# Patient Record
Sex: MALE | Race: WHITE | Employment: FULL TIME | URBAN - METROPOLITAN AREA
[De-identification: names, ages, dates, MRNs, and addresses within clinical notes are randomized per-mention and may not be internally consistent; named-entity substitution may affect disease eponyms.]

---

## 2019-01-21 ENCOUNTER — ANESTHESIA EVENT (OUTPATIENT)
Dept: OPERATING ROOM | Age: 29
End: 2019-01-21
Payer: OTHER GOVERNMENT

## 2019-01-22 ENCOUNTER — ANESTHESIA (OUTPATIENT)
Dept: OPERATING ROOM | Age: 29
End: 2019-01-22
Payer: OTHER GOVERNMENT

## 2019-01-22 ENCOUNTER — HOSPITAL ENCOUNTER (OUTPATIENT)
Age: 29
Setting detail: OUTPATIENT SURGERY
Discharge: HOME | End: 2019-01-22
Attending: SPECIALIST | Admitting: SPECIALIST
Payer: OTHER GOVERNMENT

## 2019-01-22 VITALS
DIASTOLIC BLOOD PRESSURE: 84 MMHG | RESPIRATION RATE: 16 BRPM | TEMPERATURE: 97 F | SYSTOLIC BLOOD PRESSURE: 117 MMHG | OXYGEN SATURATION: 96 % | BODY MASS INDEX: 27.47 KG/M2 | HEIGHT: 67 IN | WEIGHT: 175 LBS | HEART RATE: 82 BPM

## 2019-01-22 VITALS
SYSTOLIC BLOOD PRESSURE: 95 MMHG | DIASTOLIC BLOOD PRESSURE: 46 MMHG | OXYGEN SATURATION: 94 % | TEMPERATURE: 98.6 F | RESPIRATION RATE: 12 BRPM

## 2019-01-22 PROCEDURE — 2709999900 HC NON-CHARGEABLE SUPPLY: Performed by: SPECIALIST

## 2019-01-22 PROCEDURE — 6360000002 HC RX W HCPCS: Performed by: NURSE ANESTHETIST, CERTIFIED REGISTERED

## 2019-01-22 PROCEDURE — 3700000001 HC ADD 15 MINUTES (ANESTHESIA): Performed by: SPECIALIST

## 2019-01-22 PROCEDURE — 7100000011 HC PHASE II RECOVERY - ADDTL 15 MIN: Performed by: SPECIALIST

## 2019-01-22 PROCEDURE — 3609012400 HC EGD TRANSORAL BIOPSY SINGLE/MULTIPLE: Performed by: SPECIALIST

## 2019-01-22 PROCEDURE — 2580000003 HC RX 258: Performed by: SPECIALIST

## 2019-01-22 PROCEDURE — 7100000010 HC PHASE II RECOVERY - FIRST 15 MIN: Performed by: SPECIALIST

## 2019-01-22 PROCEDURE — 3700000000 HC ANESTHESIA ATTENDED CARE: Performed by: SPECIALIST

## 2019-01-22 PROCEDURE — 2500000003 HC RX 250 WO HCPCS: Performed by: NURSE ANESTHETIST, CERTIFIED REGISTERED

## 2019-01-22 RX ORDER — FENTANYL CITRATE 50 UG/ML
INJECTION, SOLUTION INTRAMUSCULAR; INTRAVENOUS PRN
Status: DISCONTINUED | OUTPATIENT
Start: 2019-01-22 | End: 2019-01-22 | Stop reason: SDUPTHER

## 2019-01-22 RX ORDER — OMEPRAZOLE 20 MG/1
20 CAPSULE, DELAYED RELEASE ORAL
Qty: 30 CAPSULE | Refills: 5 | Status: SHIPPED | OUTPATIENT
Start: 2019-01-22 | End: 2021-03-01

## 2019-01-22 RX ORDER — SODIUM CHLORIDE, SODIUM LACTATE, POTASSIUM CHLORIDE, CALCIUM CHLORIDE 600; 310; 30; 20 MG/100ML; MG/100ML; MG/100ML; MG/100ML
INJECTION, SOLUTION INTRAVENOUS CONTINUOUS
Status: DISCONTINUED | OUTPATIENT
Start: 2019-01-22 | End: 2019-01-22 | Stop reason: HOSPADM

## 2019-01-22 RX ORDER — PROPOFOL 10 MG/ML
INJECTION, EMULSION INTRAVENOUS PRN
Status: DISCONTINUED | OUTPATIENT
Start: 2019-01-22 | End: 2019-01-22 | Stop reason: SDUPTHER

## 2019-01-22 RX ORDER — LIDOCAINE HYDROCHLORIDE 20 MG/ML
INJECTION, SOLUTION EPIDURAL; INFILTRATION; INTRACAUDAL; PERINEURAL PRN
Status: DISCONTINUED | OUTPATIENT
Start: 2019-01-22 | End: 2019-01-22 | Stop reason: SDUPTHER

## 2019-01-22 RX ADMIN — PROPOFOL 20 MG: 10 INJECTION, EMULSION INTRAVENOUS at 09:29

## 2019-01-22 RX ADMIN — FENTANYL CITRATE 25 MCG: 50 INJECTION INTRAMUSCULAR; INTRAVENOUS at 09:27

## 2019-01-22 RX ADMIN — LIDOCAINE HYDROCHLORIDE 100 MG: 20 INJECTION, SOLUTION EPIDURAL; INFILTRATION; INTRACAUDAL; PERINEURAL at 09:24

## 2019-01-22 RX ADMIN — SODIUM CHLORIDE, POTASSIUM CHLORIDE, SODIUM LACTATE AND CALCIUM CHLORIDE: 600; 310; 30; 20 INJECTION, SOLUTION INTRAVENOUS at 08:05

## 2019-01-22 RX ADMIN — PROPOFOL 20 MG: 10 INJECTION, EMULSION INTRAVENOUS at 09:27

## 2019-01-22 RX ADMIN — FENTANYL CITRATE 50 MCG: 50 INJECTION INTRAMUSCULAR; INTRAVENOUS at 09:24

## 2019-01-22 RX ADMIN — FENTANYL CITRATE 25 MCG: 50 INJECTION INTRAMUSCULAR; INTRAVENOUS at 09:25

## 2019-01-22 RX ADMIN — PROPOFOL 20 MG: 10 INJECTION, EMULSION INTRAVENOUS at 09:25

## 2019-01-22 RX ADMIN — PROPOFOL 60 MG: 10 INJECTION, EMULSION INTRAVENOUS at 09:24

## 2019-01-22 ASSESSMENT — PAIN SCALES - GENERAL
PAINLEVEL_OUTOF10: 0
PAINLEVEL_OUTOF10: 0

## 2019-01-22 ASSESSMENT — PAIN - FUNCTIONAL ASSESSMENT: PAIN_FUNCTIONAL_ASSESSMENT: 0-10

## 2021-03-01 ENCOUNTER — HOSPITAL ENCOUNTER (EMERGENCY)
Age: 31
Discharge: HOME | End: 2021-03-01
Attending: EMERGENCY MEDICINE
Payer: OTHER GOVERNMENT

## 2021-03-01 VITALS
TEMPERATURE: 97.3 F | BODY MASS INDEX: 28.04 KG/M2 | HEART RATE: 83 BPM | OXYGEN SATURATION: 98 % | WEIGHT: 185 LBS | RESPIRATION RATE: 17 BRPM | DIASTOLIC BLOOD PRESSURE: 90 MMHG | HEIGHT: 68 IN | SYSTOLIC BLOOD PRESSURE: 129 MMHG

## 2021-03-01 DIAGNOSIS — R21 RASH: ICD-10-CM

## 2021-03-01 DIAGNOSIS — L73.9 FOLLICULITIS: Primary | ICD-10-CM

## 2021-03-01 PROCEDURE — 99284 EMERGENCY DEPT VISIT MOD MDM: CPT

## 2021-03-01 PROCEDURE — 87529 HSV DNA AMP PROBE: CPT

## 2021-03-01 RX ORDER — DOXYCYCLINE HYCLATE 100 MG
100 TABLET ORAL 2 TIMES DAILY
Qty: 20 TABLET | Refills: 0 | Status: SHIPPED | OUTPATIENT
Start: 2021-03-01 | End: 2021-03-11

## 2021-03-01 RX ORDER — VALACYCLOVIR HYDROCHLORIDE 1 G/1
1000 TABLET, FILM COATED ORAL 3 TIMES DAILY
Qty: 21 TABLET | Refills: 0 | Status: SHIPPED | OUTPATIENT
Start: 2021-03-01 | End: 2021-03-08

## 2021-03-01 NOTE — ED TRIAGE NOTES
Pt arrived via triage with c/o possible herpes breakout. Pt reports he just recently finished a steroid dose pack. Pt reports started noticing break out yesterday am. Pt is alert, oriented and ambulatory. Pt denies pain.

## 2021-03-01 NOTE — ED NOTES
Patient discharged with 2 escript prescriptions; education of medications reviewed. Pt verbalized understanding of discharge instructions. All questions answered and patient understands follow up instructions.        Georgiana Villa RN  03/01/21 5881

## 2021-03-02 NOTE — ED PROVIDER NOTES
Emergency Department Encounter    Patient: Rodolfo Coronado  MRN: [id number]  : 1990  Date of Evaluation: 3/1/2021  ED Provider:  Cary Carias    Triage Chief Complaint:   Rash    [place]:  Rodolfo Coronado is a 27 y.o. male that presents rash to his groin areas concern for herpes. Patient states that he had had intercourse with a ask who had herpes and was told that he had herpes by his primary care doctor at one point. Patient states he noted some rash to his left groin area yesterday and is progressively gotten slightly worse. Denies any fevers, chills, nausea, vomiting or diarrhea. Denies any urinary symptoms, concern for any other STDs, penile discharge or testicular pain. ROS - see HPI, below listed is current ROS at time of my eval:  General:  No fevers, no chills, no weakness  Eyes:  No recent vison changes, no discharge  ENT:  No sore throat, no nasal congestion, no hearing changes  Cardiovascular:  No chest pain, no palpitations  Respiratory:  No shortness of breath, no cough, no wheezing  Gastrointestinal:  No pain, no nausea, no vomiting, no diarrhea  Musculoskeletal:  No muscle pain, no joint pain  Skin:  + Rash  Neurologic:  No speech problems, no headache, no extremity numbness, no extremity tingling, no extremity weakness  Psychiatric:  No anxiety  Genitourinary:  No dysuria, no hematuria  Endocrine:  No unexpected weight gain, no unexpected weight loss  Extremities:  no edema, no pain    Past Medical History:   Diagnosis Date    Acid reflux      Past Surgical History:   Procedure Laterality Date    COLONOSCOPY      ENDOSCOPY, COLON, DIAGNOSTIC      ENDOSCOPY, COLON, DIAGNOSTIC  2019    erosive esophagitis, biopsy, call next week for results. start omeprazole    UPPER GASTROINTESTINAL ENDOSCOPY N/A 2019    EGD BIOPSY performed by Mckenzie Schuster MD at [address] [place] [address]       History reviewed. No pertinent family history.   Social History Socioeconomic History    Marital status:      Spouse name: Not on file    Number of children: Not on file    Years of education: Not on file    Highest education level: Not on file   Occupational History    Not on file   Social Needs    Financial resource strain: Not on file    Food insecurity     Worry: Not on file     Inability: Not on file    Transportation needs     Medical: Not on file     Non-medical: Not on file   Tobacco Use    Smoking status: Never Smoker    Smokeless tobacco: Never Used   Substance and Sexual Activity    Alcohol use: Yes     Comment: rare    Drug use: No    Sexual activity: Not on file   Lifestyle    Physical activity     Days per week: Not on file     Minutes per session: Not on file    Stress: Not on file   Relationships    Social connections     Talks on phone: Not on file     Gets together: Not on file     Attends [employer] service: Not on file     Active member of club or organization: Not on file     Attends meetings of clubs or organizations: Not on file     Relationship status: Not on file    Intimate partner violence     Fear of current or ex partner: Not on file     Emotionally abused: Not on file     Physically abused: Not on file     Forced sexual activity: Not on file   Other Topics Concern    Not on file   Social History Narrative    Not on file     No current facility-administered medications for this encounter.       Current Outpatient Medications   Medication Sig Dispense Refill    valACYclovir (VALTREX) 1 g tablet Take 1 tablet by mouth 3 times daily for 7 days 21 tablet 0    doxycycline hyclate (VIBRA-TABS) 100 MG tablet Take 1 tablet by mouth 2 times daily for 10 days 20 tablet 0     No Known Allergies    Nursing Notes Reviewed    Physical Exam:  Triage VS:    ED Triage Vitals [03/01/21 6313]   Enc Vitals Group      BP (!) 129/90      Pulse 83      Resp 17      Temp 97.3 °F (36.3 °C)      Temp Source Oral      SpO2 98 %      Weight 185 lb (83.9 kg)      Height 5' 8\" (1.727 m)      Head Circumference       Peak Flow       Pain Score       Pain Loc       Pain Edu? Excl. in [address]? Constitutional: Well developed, Well nourished, no acute distress  HENT: Normocephalic, Atraumatic, Bilateral external ears normal, Oropharynx moist, No oral exudates, Nose normal.   Eyes: PERRL, EOMI, Conjunctiva normal, No discharge. No scleral icterus. Neck: Normal range of motion, No tenderness, Supple. Lymphatic: No lymphadenopathy noted. Cardiovascular: Normal heart rate, Normal rhythm, No murmurs, gallops or rubs. Thorax & Lungs: Normal breath sounds, No respiratory distress, No wheezing. Abdomen: Soft, No tenderness, no rebound, no guarding, no masses, No pulsatile masses, No distention, Normal bowel sounds  Skin: Warm, Dry, Pink, No mottling, No erythema, see  exam  Back: No tenderness, No CVA tenderness. No point tenderness the spine  Extremities: No edema, No tenderness, No cyanosis, Normal perfusion, No clubbing. Musculoskeletal: Good range of motion in all major joints as observed. No major deformities noted. Neurologic: Alert & oriented x 3, Normal motor function, Normal sensory function, CN II-XII grossly intact as tested, No focal deficits noted. Gait intact. Psychiatric: Affect normal, Judgment normal, Mood normal.   : Chaperoned by nurse, circumcised male, no penile lesions noted, no testicular tenderness to palpation, small area of redness in the left groin appearance of folliculitis with no vesicular lesions noted. I have reviewed and interpreted all of the currently available lab results from this visit (if applicable):  No results found for this visit on 03/01/21. Radiographs (if obtained):  Radiologist's Report Reviewed:  No results found. EKG (if obtained): (All EKG's are interpreted by myself in the absence of a cardiologist)      MDM:  Patient presents with concerns for a rash to his left lower groin area.   Patient has no lesions on his penis or testicle. He has no vesicular lesions and has a rash appearance of folliculitis. Otherwise well-appearing. Will treat with doxycycline and valacyclovir. HSV swab was sent. Patient given strict return precautions and follow-up with primary care doctor. Clinical Impression:  1. Folliculitis    2. Rash      Disposition referral (if applicable):  your primary care doctor    In 1 day      Disposition medications (if applicable):  Discharge Medication List as of 3/1/2021  5:00 PM      START taking these medications    Details   valACYclovir (VALTREX) 1 g tablet Take 1 tablet by mouth 3 times daily for 7 days, Disp-21 tablet, R-0Normal      doxycycline hyclate (VIBRA-TABS) 100 MG tablet Take 1 tablet by mouth 2 times daily for 10 days, Disp-20 tablet, R-0Normal           ED Provider Disposition Time  DISPOSITION Decision To Discharge 03/01/2021 04:40:06 PM      Comment: Please note this report has been produced using speech recognition software and may contain errors related to that system including errors in grammar, punctuation, and spelling, as well as words and phrases that may be inappropriate. Efforts were made to edit the dictations.         Fransisco Massey MD  03/01/21 5908

## 2021-03-06 LAB — HERPES SIMPLEX VIRUS BY PCR: NOT DETECTED

## 2023-01-26 ENCOUNTER — HOSPITAL ENCOUNTER (EMERGENCY)
Age: 33
Discharge: HOME | End: 2023-01-26
Attending: EMERGENCY MEDICINE
Payer: OTHER GOVERNMENT

## 2023-01-26 VITALS
WEIGHT: 190 LBS | TEMPERATURE: 97.3 F | HEIGHT: 68 IN | DIASTOLIC BLOOD PRESSURE: 95 MMHG | HEART RATE: 88 BPM | BODY MASS INDEX: 28.79 KG/M2 | OXYGEN SATURATION: 98 % | SYSTOLIC BLOOD PRESSURE: 135 MMHG | RESPIRATION RATE: 16 BRPM

## 2023-01-26 DIAGNOSIS — M62.838 TRAPEZIUS MUSCLE SPASM: Primary | ICD-10-CM

## 2023-01-26 PROCEDURE — 99282 EMERGENCY DEPT VISIT SF MDM: CPT

## 2023-01-26 ASSESSMENT — PAIN SCALES - GENERAL: PAINLEVEL_OUTOF10: 6

## 2023-01-26 ASSESSMENT — PAIN - FUNCTIONAL ASSESSMENT: PAIN_FUNCTIONAL_ASSESSMENT: 0-10

## 2023-01-26 NOTE — ED PROVIDER NOTES
Emergency Department Encounter    Patient: Alex Garrison  MRN: [id number]  : 1990  Date of Evaluation: 2023  ED Provider:  María Anderson MD    MDM:    Clinical Impression:  1. Trapezius muscle spasm          Triage Chief Complaint: Neck Pain      Patient presents with right-sided neck pain as below. Additional history was obtained from: Patient    I completed a structured, evidence-based clinical evaluation to screen for acute emergent condition that poses a threat to life or bodily function. Diagnostic studies/Differential diagnosis included: Patient has no focal neurologic deficits. He has no recent injury or trauma and no midline neck pain. No evidence of acute neurologic, vascular, infectious emergency broken spine. There is no evidence of cervical vessel injury at this time. Patient symptoms are most consistent with right trapezius muscle spasm. Patient has Robaxin and ibuprofen at home. Patient states that he does not require any additional medications from the emergency department. Patient will increase the dose of his Robaxin from 500 mg to 1000 mg 3 times daily. No rash to suggest varicella-zoster. Medications ordered in the ED:  ED Medication Orders (From admission, onward)      None            Patient's response to treatment: Patient declined the need for pain medication in the emergency department. PLAN  Disposition: Patient will be discharged with strict return precautions and follow up instructions.   Decision To Discharge 2023 01:55:07 PM     Disposition referral (if applicable):  Idris Vasquez MD  [address] [place] [address]  [phone]    In 2 days      Medications prescribed (if applicable):  New Prescriptions    No medications on file           ===================================================================    HPI/Pertinent ROS:  Alex Garrison is a 28 y.o. male that presents complaining of 1 day history of right-sided posterior neck pain that extends into the right shoulder and medial to the right scapula. Patient states the pain began while the patient was in bed last night. Patient treated with 500 mg of Robaxin and 800 mg of ibuprofen with improvement. No chest pain or shortness of breath. No changes in color, sensation, strength of the upper extremities. No recent injury or trauma. No midline neck pain. No rash. Physical Exam:  Triage VS:    ED Triage Vitals [01/26/23 1332]   Enc Vitals Group      BP (!) 135/95      Heart Rate 88      Resp 16      Temp 97.3 °F (36.3 °C)      Temp Source Temporal      SpO2 98 %      Weight 190 lb (86.2 kg)      Height 5' 8\" (1.727 m)      Head Circumference       Peak Flow       Pain Score       Pain Loc       Pain Edu? Excl. in [address]? General appearance:  No acute distress. Skin:  Warm. Dry. Eye:  Extraocular movements intact. Ears, nose, mouth and throat:  Oral mucosa moist   Neck:  Trachea midline. Patient has no midline tenderness palpation of the cervical spine. There is tenderness to palpation along the right trapezius muscle with increased muscle tension. Back: No midline tenderness to palpation of the thoracic or lumbar sacral spine. Extremity:  No swelling. Normal ROM     Heart:  Regular rate and rhythm, normal S1 & S2, no extra heart sounds. Perfusion:  intact  Respiratory:  Lungs clear to auscultation bilaterally. Respirations nonlabored. Neurological:  Alert and oriented times 3. No focal neuro deficits. Psychiatric:  Appropriate    Past Medical History:   Diagnosis Date    Acid reflux      Past Surgical History:   Procedure Laterality Date    COLONOSCOPY  2012    ENDOSCOPY, COLON, DIAGNOSTIC  2012    ENDOSCOPY, COLON, DIAGNOSTIC  01/22/2019    erosive esophagitis, biopsy, call next week for results.  start omeprazole    UPPER GASTROINTESTINAL ENDOSCOPY N/A 1/22/2019    EGD BIOPSY performed by Juan Lofton MD at [facility] WISDOM TOOTH EXTRACTION       History reviewed. No pertinent family history. Social History     Socioeconomic History    Marital status:      Spouse name: Not on file    Number of children: Not on file    Years of education: Not on file    Highest education level: Not on file   Occupational History    Not on file   Tobacco Use    Smoking status: Never    Smokeless tobacco: Never   Substance and Sexual Activity    Alcohol use: Yes     Comment: rare    Drug use: No    Sexual activity: Not on file   Other Topics Concern    Not on file   Social History Narrative    Not on file     Social Determinants of Health     Financial Resource Strain: Not on file   Food Insecurity: Not on file   Transportation Needs: Not on file   Physical Activity: Not on file   Stress: Not on file   Social Connections: Not on file   Intimate Partner Violence: Not on file   Housing Stability: Not on file     No current facility-administered medications for this encounter. No current outpatient medications on file. No Known Allergies    Nursing Notes Reviewed    I have reviewed and interpreted all of the currently available lab results from this visit (if applicable):  No results found for this visit on 01/26/23. Radiographs (if obtained):  Radiologist's Report Reviewed:  No orders to display           Comment: Please note this report has been produced using speech recognition software and may contain errors related to that system including errors in grammar, punctuation, and spelling, as well as words and phrases that may be inappropriate. Efforts were made to edit the dictations.         Radha Castellanos MD  01/26/23 7428

## 2023-01-26 NOTE — ED NOTES
Patient verbalizes understanding of discharge instructions. Patient walks out of ED with an upright and steady gait with even and unlabroed respirations.       Shawna Traylor RN  01/26/23 5585

## 2023-01-26 NOTE — DISCHARGE INSTRUCTIONS
Return immediately to the emergency department if you experience new or worsened symptoms, weakness or changes in sensation, increased pain, chest pain or shortness of breath, or for any other concerns.

## 2023-01-26 NOTE — ED TRIAGE NOTES
Patient C/O neck pain that started last night after bowling. Patient took Ibuprofen and robaxin PTA.

## 2023-10-20 ENCOUNTER — HOSPITAL ENCOUNTER (EMERGENCY)
Age: 33
Discharge: HOME | End: 2023-10-20
Attending: EMERGENCY MEDICINE
Payer: OTHER GOVERNMENT

## 2023-10-20 VITALS
RESPIRATION RATE: 16 BRPM | HEART RATE: 77 BPM | WEIGHT: 180 LBS | BODY MASS INDEX: 27.28 KG/M2 | DIASTOLIC BLOOD PRESSURE: 77 MMHG | OXYGEN SATURATION: 98 % | TEMPERATURE: 97.8 F | SYSTOLIC BLOOD PRESSURE: 120 MMHG | HEIGHT: 68 IN

## 2023-10-20 DIAGNOSIS — H60.501 ACUTE OTITIS EXTERNA OF RIGHT EAR, UNSPECIFIED TYPE: ICD-10-CM

## 2023-10-20 DIAGNOSIS — H66.001 NON-RECURRENT ACUTE SUPPURATIVE OTITIS MEDIA OF RIGHT EAR WITHOUT SPONTANEOUS RUPTURE OF TYMPANIC MEMBRANE: Primary | ICD-10-CM

## 2023-10-20 PROCEDURE — 6370000000 HC RX 637 (ALT 250 FOR IP): Performed by: EMERGENCY MEDICINE

## 2023-10-20 PROCEDURE — 99283 EMERGENCY DEPT VISIT LOW MDM: CPT

## 2023-10-20 RX ORDER — AMOXICILLIN AND CLAVULANATE POTASSIUM 875; 125 MG/1; MG/1
1 TABLET, FILM COATED ORAL ONCE
Status: COMPLETED | OUTPATIENT
Start: 2023-10-20 | End: 2023-10-20

## 2023-10-20 RX ORDER — CIPROFLOXACIN AND DEXAMETHASONE 3; 1 MG/ML; MG/ML
4 SUSPENSION/ DROPS AURICULAR (OTIC) 2 TIMES DAILY
Qty: 7.5 ML | Refills: 0 | Status: SHIPPED | OUTPATIENT
Start: 2023-10-20 | End: 2023-10-27

## 2023-10-20 RX ORDER — AMOXICILLIN AND CLAVULANATE POTASSIUM 875; 125 MG/1; MG/1
1 TABLET, FILM COATED ORAL 2 TIMES DAILY
Qty: 14 TABLET | Refills: 0 | Status: SHIPPED | OUTPATIENT
Start: 2023-10-20 | End: 2023-10-27

## 2023-10-20 RX ADMIN — AMOXICILLIN AND CLAVULANATE POTASSIUM 1 TABLET: 875; 125 TABLET, FILM COATED ORAL at 19:06

## 2023-10-20 NOTE — ED PROVIDER NOTES
[address]  [phone]    If symptoms worsen    Disposition medications (if applicable):  New Prescriptions    AMOXICILLIN-CLAVULANATE (AUGMENTIN) 875-125 MG PER TABLET    Take 1 tablet by mouth 2 times daily for 7 days    CIPROFLOXACIN-DEXAMETHASONE (CIPRODEX) 0.3-0.1 % OTIC SUSPENSION    Place 4 drops into the right ear 2 times daily for 7 days       Comment: Please note this report has been produced using speech recognition software and may contain errors related to that system including errors in grammar, punctuation, and spelling, as well as words and phrases that may be inappropriate. If there are any questions or concerns please feel free to contact the dictating provider for clarification.        Meg Anne MD  10/20/23 1357

## 2023-11-14 DIAGNOSIS — G57.71 CAUSALGIA OF RIGHT LOWER EXTREMITY: ICD-10-CM

## 2023-11-14 DIAGNOSIS — M54.16 LUMBAR RADICULOPATHY: Primary | ICD-10-CM

## 2024-02-05 ENCOUNTER — PROCEDURE VISIT (OUTPATIENT)
Dept: NEUROLOGY | Age: 34
End: 2024-02-05

## 2024-02-05 DIAGNOSIS — M54.17 RIGHT LUMBOSACRAL RADICULOPATHY: Primary | ICD-10-CM

## 2024-02-05 NOTE — PROGRESS NOTES
EMG    Risks and benefits of study discussed.    Specific and common risks of pain and bleeding as well as uncommon side effects of infection, hematoma and vasovagal episodes    Patient agreeable to testing and consents to such.    Clinical: Several years of right-sided sciatic pain, lumbar pain paresthesias involving the lateral right foot.    Motor NCS:  Tibial and peroneal amplitudes, latencies and velocities normal bilateral    Sensory NCS:  Sural and peroneal amplitudes and latencies normal bilateral  Right medial and lateral plantar responses normal for amplitudes and latencies.    Needle EMG: Normal findings throughout both lower limbs.    Impression:  #1  Regarding lumbar radiculopathy symptoms:  normal study without axon loss associated with lumbar radiculopathy.  - Normal EMG can not rule out painful/sensory radiculopathy, without motor axon loss involvement.  #2 no evidence to suggest generalized peripheral neuropathy, mononeuropathy or plexopathy affecting lower limbs.

## 2024-02-07 ENCOUNTER — HOSPITAL ENCOUNTER (EMERGENCY)
Age: 34
Discharge: HOME | End: 2024-02-07
Attending: EMERGENCY MEDICINE
Payer: COMMERCIAL

## 2024-02-07 VITALS
BODY MASS INDEX: 26.52 KG/M2 | OXYGEN SATURATION: 98 % | DIASTOLIC BLOOD PRESSURE: 78 MMHG | HEIGHT: 68 IN | WEIGHT: 175 LBS | HEART RATE: 87 BPM | SYSTOLIC BLOOD PRESSURE: 136 MMHG | TEMPERATURE: 97.3 F | RESPIRATION RATE: 17 BRPM

## 2024-02-07 DIAGNOSIS — J01.90 ACUTE NON-RECURRENT SINUSITIS, UNSPECIFIED LOCATION: ICD-10-CM

## 2024-02-07 DIAGNOSIS — J02.9 ACUTE PHARYNGITIS, UNSPECIFIED ETIOLOGY: Primary | ICD-10-CM

## 2024-02-07 PROCEDURE — 87081 CULTURE SCREEN ONLY: CPT

## 2024-02-07 PROCEDURE — 87077 CULTURE AEROBIC IDENTIFY: CPT

## 2024-02-07 PROCEDURE — 99283 EMERGENCY DEPT VISIT LOW MDM: CPT

## 2024-02-07 PROCEDURE — 87430 STREP A AG IA: CPT

## 2024-02-07 RX ORDER — BENZONATATE 100 MG/1
100 CAPSULE ORAL 3 TIMES DAILY PRN
Qty: 10 CAPSULE | Refills: 0 | Status: SHIPPED | OUTPATIENT
Start: 2024-02-07 | End: 2024-02-14

## 2024-02-07 RX ORDER — DOXYCYCLINE HYCLATE 100 MG
100 TABLET ORAL 2 TIMES DAILY
Qty: 20 TABLET | Refills: 0 | Status: SHIPPED | OUTPATIENT
Start: 2024-02-07 | End: 2024-02-17

## 2024-02-07 RX ORDER — PSEUDOEPHEDRINE HCL 30 MG
60 TABLET ORAL EVERY 6 HOURS PRN
Qty: 30 TABLET | Refills: 0 | Status: SHIPPED | OUTPATIENT
Start: 2024-02-07 | End: 2025-02-06

## 2024-02-07 RX ORDER — GUAIFENESIN 600 MG/1
1200 TABLET, EXTENDED RELEASE ORAL 2 TIMES DAILY
Qty: 40 TABLET | Refills: 0 | Status: SHIPPED | OUTPATIENT
Start: 2024-02-07 | End: 2024-02-17

## 2024-02-07 NOTE — ED PROVIDER NOTES
Emergency Department Encounter    Patient: Travis Isabel  MRN: [id number]  : 1990  Date of Evaluation: 2024  ED Provider:  Sylvester Ceja MD    MDM:    Clinical Impression:  1. Acute pharyngitis, unspecified etiology    2. Acute non-recurrent sinusitis, unspecified location          Triage Chief Complaint: Pharyngitis (Symptoms for 3 weeks, daughter tested positive for strep today. ) and Cough        I completed a structured, evidence-based clinical evaluation to screen for acute emergent condition that poses a threat to life or bodily function.      Diagnostic studies/Differential diagnosis included: (with independent interpretations \"as interpreted by me\" and tests considered but not performed) Patient presenting with viral syndrome.  At this point symptoms appear most consistent with a viral illness, versus another process early in its course.  I estimate there is low risk for, but not limited to, Acute coronary syndrome, pulmonary embolus, aortic dissection, pneumonia requiring admission, sepsis, bacterial meningitis.    Strep screen negative.  No evidence of retropharyngeal or peritonsillar abscess.  No Demarco's angina.  Given duration of sinus symptoms, patient will be treated for acute sinusitis with antibiotics and other treatment as below.    No hypoxia.  No tachypnea or increased work of breathing.  No evidence of pneumonia.    Patient is nontoxic appearing, appears well hydrated.  No indication for imaging here.  Patient is tolerating oral intake without difficulty.  Patient understands that at this time there is no evidence for another underlying process, however that early in the process of any illness or infection an initial workup/presentation can be falsely reassuring/negative.  Based on history, physical exam and discussion with patient, the patient will be treated symptomatically and will be discharged home.  Patient was instructed on symptomatic treatment, monitoring and outpatient

## 2024-02-07 NOTE — DISCHARGE INSTRUCTIONS
Return immediately to the emergency department if you experience new or worsened symptoms, chest pain, shortness of breath, fever, or for any other concerns.

## 2024-02-07 NOTE — ED NOTES
DISCHARGE INSTRUCTIONS AND PRESCRIPTIONS WERE REVIEWED AND THE PATIENT WILL FOLLOW UP WITH THE PCP. THE PATIENT VOICED UNDERSTANDING. THE PATIENT VOICED UNDERSTANDING.

## 2024-02-07 NOTE — DISCHARGE INSTR - COC
Continuity of Care Form    Patient Name: Travis Isabel   :  1990  MRN:  [id number]    Admit date:  2024  Discharge date:  ***    Code Status Order: No Order   Advance Directives:     Admitting Physician:  No admitting provider for patient encounter.  PCP: Sylvester Coley MD    Discharging Nurse: ***  Discharging Hospital Unit/Room#: ED-01/E01  Discharging Unit Phone Number: ***    Emergency Contact:   Extended Emergency Contact Information  Primary Emergency Contact: Lori Isabel  Mobile Phone: [phone]  Relation: Brother/Sister    Past Surgical History:  Past Surgical History:   Procedure Laterality Date    COLONOSCOPY      ENDOSCOPY, COLON, DIAGNOSTIC      ENDOSCOPY, COLON, DIAGNOSTIC  2019    erosive esophagitis, biopsy, call next week for results. start omeprazole    UPPER GASTROINTESTINAL ENDOSCOPY N/A 2019    EGD BIOPSY performed by Sylvester Coley MD at [facility] OR    WISDOM TOOTH EXTRACTION         Immunization History:     There is no immunization history on file for this patient.    Active Problems:  There is no problem list on file for this patient.      Isolation/Infection:   Isolation            No Isolation          Patient Infection Status       None to display            Nurse Assessment:  Last Vital Signs: /78   Pulse 87   Temp 97.3 °F (36.3 °C) (Tympanic)   Resp 17   Ht 1.727 m (5' 8\")   Wt 79.4 kg (175 lb)   SpO2 98%   BMI 26.61 kg/m²     Last documented pain score (0-10 scale):    Last Weight:   Wt Readings from Last 1 Encounters:   24 79.4 kg (175 lb)     Mental Status:  {IP PT MENTAL STATUS:13083}    IV Access:  { LOLA IV ACCESS:016392621}    Nursing Mobility/ADLs:  Walking   {CHP DME ADLs:229921035}  Transfer  {CHP DME ADLs:782741643}  Bathing  {CHP DME ADLs:011917837}  Dressing  {CHP DME ADLs:802337105}  Toileting  {CHP DME ADLs:299402281}  Feeding  {CHP DME ADLs:191641566}  Med Admin  {P DME ADLs:727887708}  Med Delivery   { LOLA

## 2024-02-09 ENCOUNTER — TELEPHONE (OUTPATIENT)
Dept: PHARMACY | Age: 34
End: 2024-02-09

## 2024-02-09 LAB
CULTURE: ABNORMAL
CULTURE: ABNORMAL
Lab: ABNORMAL
SPECIMEN: ABNORMAL
STREP A DIRECT SCREEN: NEGATIVE

## 2024-02-09 RX ORDER — AMOXICILLIN 500 MG/1
500 CAPSULE ORAL 2 TIMES DAILY
Qty: 20 CAPSULE | Refills: 0 | Status: SHIPPED | OUTPATIENT
Start: 2024-02-09 | End: 2024-02-19

## 2024-02-09 NOTE — TELEPHONE ENCOUNTER
Pharmacy Note  ED Culture Follow-up    Travis Isabel is a 33 y.o. male.     Allergies: Patient has no known allergies.     Labs:  No results found for: \"BUN\", \"CREATININE\", \"WBC\"  CrCl cannot be calculated (No successful lab value found.).    Current antimicrobials:   Doxycycline    ASSESSMENT:  Micro results:   Throat culture: positive for Group C step     PLAN:  Need for intervention: Yes  Discussed with: Dr. Torres  Chosen treatment:    Add amoxicillin 500 mg BID x 10 days    Patient response:   Called and spoke with patient. Pt aware of results, will send in abx.  Counseled patient on importance of finishing entire course of antibiotic and following up with healthcare provider.       Called/sent in prescription to: [facility]    Please call with any questions. Ext. [phone]    Ita Mahmood RPH, PharmD 5:26 PM 2/9/2024

## 2024-02-09 NOTE — PROGRESS NOTES
Pharmacy Note  ED Culture Follow-up    Travis Isabel is a 33 y.o. male.     Allergies: Patient has no known allergies.     Labs:  No results found for: \"BUN\", \"CREATININE\", \"WBC\"  CrCl cannot be calculated (No successful lab value found.).    Current antimicrobials:   Doxycycline    ASSESSMENT:  Micro results:   Throat culture: positive for Group C step     PLAN:  Need for intervention: Yes  Discussed with: Dr. Torres  Chosen treatment:    Add amoxicillin 500 mg BID x 10 days    Patient response:   Called and spoke with patient. Pt aware of results, will send in abx.  Counseled patient on importance of finishing entire course of antibiotic and following up with healthcare provider.       Called/sent in prescription to:  [facility]    Please call with any questions. Ext. [phone]    Ita Mahmood RPH, PharmD 5:26 PM 2/9/2024

## 2024-02-23 ENCOUNTER — HOSPITAL ENCOUNTER (OUTPATIENT)
Dept: MRI IMAGING | Age: 34
Discharge: HOME | End: 2024-02-23
Payer: COMMERCIAL

## 2024-02-23 DIAGNOSIS — M54.16 LUMBAR RADICULOPATHY: ICD-10-CM

## 2024-02-23 PROCEDURE — 72148 MRI LUMBAR SPINE W/O DYE: CPT

## 2024-03-08 ENCOUNTER — HOSPITAL ENCOUNTER (EMERGENCY)
Age: 34
Discharge: HOME | End: 2024-03-08
Attending: EMERGENCY MEDICINE
Payer: COMMERCIAL

## 2024-03-08 VITALS
WEIGHT: 180 LBS | BODY MASS INDEX: 27.28 KG/M2 | DIASTOLIC BLOOD PRESSURE: 86 MMHG | HEART RATE: 78 BPM | HEIGHT: 68 IN | TEMPERATURE: 98 F | SYSTOLIC BLOOD PRESSURE: 127 MMHG | RESPIRATION RATE: 16 BRPM | OXYGEN SATURATION: 97 %

## 2024-03-08 DIAGNOSIS — L23.7 POISON IVY DERMATITIS: Primary | ICD-10-CM

## 2024-03-08 PROCEDURE — 6370000000 HC RX 637 (ALT 250 FOR IP): Performed by: EMERGENCY MEDICINE

## 2024-03-08 PROCEDURE — 99283 EMERGENCY DEPT VISIT LOW MDM: CPT

## 2024-03-08 PROCEDURE — 6360000002 HC RX W HCPCS: Performed by: EMERGENCY MEDICINE

## 2024-03-08 RX ORDER — IBUPROFEN 800 MG/1
800 TABLET ORAL
COMMUNITY
Start: 2023-12-28

## 2024-03-08 RX ORDER — DIPHENHYDRAMINE HCL 25 MG
50 CAPSULE ORAL EVERY 6 HOURS PRN
Qty: 20 CAPSULE | Refills: 0 | Status: SHIPPED | OUTPATIENT
Start: 2024-03-08 | End: 2024-03-18

## 2024-03-08 RX ORDER — FAMOTIDINE 20 MG/1
20 TABLET, FILM COATED ORAL ONCE
Status: COMPLETED | OUTPATIENT
Start: 2024-03-08 | End: 2024-03-08

## 2024-03-08 RX ORDER — FAMOTIDINE 20 MG/1
20 TABLET, FILM COATED ORAL 2 TIMES DAILY
Qty: 10 TABLET | Refills: 0 | Status: SHIPPED | OUTPATIENT
Start: 2024-03-08

## 2024-03-08 RX ORDER — DEXAMETHASONE 4 MG/1
4 TABLET ORAL
Qty: 15 TABLET | Refills: 0 | Status: SHIPPED | OUTPATIENT
Start: 2024-03-08 | End: 2024-03-13

## 2024-03-08 RX ORDER — TRIAMCINOLONE ACETONIDE 1 MG/G
OINTMENT TOPICAL 2 TIMES DAILY PRN
Qty: 30 G | Refills: 0 | Status: SHIPPED | OUTPATIENT
Start: 2024-03-08

## 2024-03-08 RX ORDER — DIPHENHYDRAMINE HCL 25 MG
50 TABLET ORAL ONCE
Status: COMPLETED | OUTPATIENT
Start: 2024-03-08 | End: 2024-03-08

## 2024-03-08 RX ORDER — TIZANIDINE HYDROCHLORIDE 4 MG/1
4 CAPSULE, GELATIN COATED ORAL 3 TIMES DAILY PRN
COMMUNITY
Start: 2023-12-28

## 2024-03-08 RX ORDER — DEXAMETHASONE 4 MG/1
8 TABLET ORAL ONCE
Status: COMPLETED | OUTPATIENT
Start: 2024-03-08 | End: 2024-03-08

## 2024-03-08 RX ADMIN — DIPHENHYDRAMINE HYDROCHLORIDE 50 MG: 25 TABLET ORAL at 09:40

## 2024-03-08 RX ADMIN — FAMOTIDINE 20 MG: 20 TABLET ORAL at 09:40

## 2024-03-08 RX ADMIN — DEXAMETHASONE 8 MG: 4 TABLET ORAL at 09:40

## 2024-03-08 ASSESSMENT — PAIN - FUNCTIONAL ASSESSMENT: PAIN_FUNCTIONAL_ASSESSMENT: NONE - DENIES PAIN

## 2024-03-08 NOTE — ED PROVIDER NOTES
eMERGENCY dEPARTMENT eNCOUnter        CHIEF COMPLAINT    Chief Complaint   Patient presents with    Rash     \"Poison Ivy\" all over        HPI    Travis Isabel is a 33 y.o. male who presents with a rash. Onset was 4 days ago.  Patient states that the 20 to denies it because his face and he went there to help him out and he got into a plant allergy and has a rash on lower which is burning sensation does not itch much she has been using calamine lotion.  He does have a history of despond allergies in the past he believes could be poison ivy or poison sumac and dormant phase at present.  No oral lesions no difficulty breathing no palpitation no syncope.    REVIEW OF SYSTEMS    Pulmonary: No difficulty breathing or chest tightness  Skin: Positive rash  ENT: No throat swelling or tongue swelling  General: No fevers or syncope    PAST MEDICAL & SURGICAL HISTORY    Past Medical History:   Diagnosis Date    Acid reflux     Asthma      Past Surgical History:   Procedure Laterality Date    COLONOSCOPY  2012    ENDOSCOPY, COLON, DIAGNOSTIC  2012    ENDOSCOPY, COLON, DIAGNOSTIC  01/22/2019    erosive esophagitis, biopsy, call next week for results. start omeprazole    UPPER GASTROINTESTINAL ENDOSCOPY N/A 1/22/2019    EGD BIOPSY performed by Sylvester Coley MD at [facility] OR    WISDOM TOOTH EXTRACTION         CURRENT MEDICATIONS    Current Outpatient Rx   Medication Sig Dispense Refill    ibuprofen (ADVIL;MOTRIN) 800 MG tablet Take 1 tablet by mouth      tiZANidine (ZANAFLEX) 4 MG capsule Take 1 capsule by mouth 3 times daily as needed      diphenhydrAMINE (BENADRYL) 25 MG capsule Take 2 capsules by mouth every 6 hours as needed for Itching 20 capsule 0    dexAMETHasone (DECADRON) 4 MG tablet Take 1 tablet by mouth 3 times daily (with meals) for 5 days 15 tablet 0    famotidine (PEPCID) 20 MG tablet Take 1 tablet by mouth 2 times daily 10 tablet 0    triamcinolone (KENALOG) 0.1 % ointment Apply topically 2 times daily as needed

## 2024-08-24 ENCOUNTER — HOSPITAL ENCOUNTER (EMERGENCY)
Age: 34
Discharge: HOME | End: 2024-08-24
Attending: EMERGENCY MEDICINE
Payer: OTHER GOVERNMENT

## 2024-08-24 VITALS
OXYGEN SATURATION: 97 % | DIASTOLIC BLOOD PRESSURE: 93 MMHG | TEMPERATURE: 97.2 F | WEIGHT: 180 LBS | BODY MASS INDEX: 27.28 KG/M2 | SYSTOLIC BLOOD PRESSURE: 143 MMHG | HEIGHT: 68 IN | RESPIRATION RATE: 16 BRPM | HEART RATE: 74 BPM

## 2024-08-24 DIAGNOSIS — Z20.2 POSSIBLE EXPOSURE TO STD: Primary | ICD-10-CM

## 2024-08-24 PROCEDURE — 6370000000 HC RX 637 (ALT 250 FOR IP): Performed by: EMERGENCY MEDICINE

## 2024-08-24 PROCEDURE — 87591 N.GONORRHOEAE DNA AMP PROB: CPT

## 2024-08-24 PROCEDURE — 96372 THER/PROPH/DIAG INJ SC/IM: CPT

## 2024-08-24 PROCEDURE — 87491 CHLMYD TRACH DNA AMP PROBE: CPT

## 2024-08-24 PROCEDURE — 99284 EMERGENCY DEPT VISIT MOD MDM: CPT

## 2024-08-24 PROCEDURE — 2500000003 HC RX 250 WO HCPCS: Performed by: EMERGENCY MEDICINE

## 2024-08-24 PROCEDURE — 6360000002 HC RX W HCPCS: Performed by: EMERGENCY MEDICINE

## 2024-08-24 RX ORDER — DOXYCYCLINE HYCLATE 100 MG
100 TABLET ORAL 2 TIMES DAILY
Qty: 14 TABLET | Refills: 0 | Status: SHIPPED | OUTPATIENT
Start: 2024-08-24 | End: 2024-08-31

## 2024-08-24 RX ORDER — DOXYCYCLINE HYCLATE 100 MG
100 TABLET ORAL ONCE
Status: COMPLETED | OUTPATIENT
Start: 2024-08-24 | End: 2024-08-24

## 2024-08-24 RX ADMIN — CEFTRIAXONE 500 MG: 1 INJECTION, POWDER, FOR SOLUTION INTRAMUSCULAR; INTRAVENOUS at 21:07

## 2024-08-24 RX ADMIN — DOXYCYCLINE HYCLATE 100 MG: 100 TABLET, COATED ORAL at 21:06

## 2024-08-24 ASSESSMENT — PAIN - FUNCTIONAL ASSESSMENT: PAIN_FUNCTIONAL_ASSESSMENT: 0-10

## 2024-08-24 ASSESSMENT — PAIN SCALES - GENERAL: PAINLEVEL_OUTOF10: 4

## 2024-08-24 ASSESSMENT — PAIN DESCRIPTION - LOCATION: LOCATION: PENIS

## 2024-08-25 NOTE — DISCHARGE INSTR - COC
Continuity of Care Form    Patient Name: Travis Isabel   :  1990  MRN:  [id number]    Admit date:  2024  Discharge date:  ***    Code Status Order: No Order   Advance Directives:   Advance Care Flowsheet Documentation             Admitting Physician:  No admitting provider for patient encounter.  PCP: Arash Cheema MD    Discharging Nurse: ***  Discharging Hospital Unit/Room#: ED-10/E10  Discharging Unit Phone Number: ***    Emergency Contact:   Extended Emergency Contact Information  Primary Emergency Contact: Lori Isabel  Mobile Phone: [phone]  Relation: Brother/Sister    Past Surgical History:  Past Surgical History:   Procedure Laterality Date    COLONOSCOPY      ENDOSCOPY, COLON, DIAGNOSTIC      ENDOSCOPY, COLON, DIAGNOSTIC  2019    erosive esophagitis, biopsy, call next week for results. start omeprazole    UPPER GASTROINTESTINAL ENDOSCOPY N/A 2019    EGD BIOPSY performed by Sylvester Coley MD at [facility] OR    WISDOM TOOTH EXTRACTION         Immunization History:     There is no immunization history on file for this patient.    Active Problems:  There is no problem list on file for this patient.      Isolation/Infection:   Isolation            No Isolation          Patient Infection Status       None to display            Nurse Assessment:  Last Vital Signs: BP (!) 143/93   Pulse 74   Temp 97.2 °F (36.2 °C)   Resp 16   Ht 1.727 m (5' 8\")   Wt 81.6 kg (180 lb)   SpO2 97%   BMI 27.37 kg/m²     Last documented pain score (0-10 scale): Pain Level: 4  Last Weight:   Wt Readings from Last 1 Encounters:   24 81.6 kg (180 lb)     Mental Status:  {IP PT MENTAL STATUS:05289}    IV Access:  { LOLA IV ACCESS:800996026}    Nursing Mobility/ADLs:  Walking   {CHP DME ADLs:973740343}  Transfer  {CHP DME ADLs:686142393}  Bathing  {CHP DME ADLs:853283848}  Dressing  {CHP DME ADLs:443147372}  Toileting  {CHP DME ADLs:865442415}  Feeding  {CHP DME ADLs:805606846}  Med Admin   { Patient Condition:182037328}    Rehab Potential (if transferring to Rehab): {Prognosis:1483070517}    Recommended Labs or Other Treatments After Discharge: ***    Physician Certification: I certify the above information and transfer of Travis ERASMO Chalo  is necessary for the continuing treatment of the diagnosis listed and that he requires {Admit to Appropriate Level of Care:06805} for {GREATER/LESS:417126445} 30 days.     Update Admission H&P: {CHP DME Changes in HandP:653841689}    PHYSICIAN SIGNATURE:  {Esignature:550864237}

## 2024-08-25 NOTE — ED PROVIDER NOTES
Triage Chief Complaint:   Exposure to STD (Slept with old girlfriend. Poss exposure to chlamydia. Burning \"down there'. Onset today./)    [place]:  Travis Isabel is a 34 y.o. male that presents with burning penile pain, concern for chlamydial infection because this feels similar to prior chlamydia infections that he has had any just recently slept with the same partner that had chlamydia before in the past.  Denies penile discharge, lesions, testicular pain, nausea, vomiting, fevers.  No other acute complaints.  Requesting antibiotics.    ROS:  At least 6 systems reviewed and otherwise acutely negative except as in the [place].    Past Medical History:   Diagnosis Date    Acid reflux     Asthma      Past Surgical History:   Procedure Laterality Date    COLONOSCOPY  2012    ENDOSCOPY, COLON, DIAGNOSTIC  2012    ENDOSCOPY, COLON, DIAGNOSTIC  01/22/2019    erosive esophagitis, biopsy, call next week for results. start omeprazole    UPPER GASTROINTESTINAL ENDOSCOPY N/A 1/22/2019    EGD BIOPSY performed by Sylvester Coley MD at [facility] OR    WISDOM TOOTH EXTRACTION       No family history on file.  Social History     Socioeconomic History    Marital status:      Spouse name: Not on file    Number of children: Not on file    Years of education: Not on file    Highest education level: Not on file   Occupational History    Not on file   Tobacco Use    Smoking status: Never    Smokeless tobacco: Never   Substance and Sexual Activity    Alcohol use: Yes     Comment: rare    Drug use: No    Sexual activity: Not on file   Other Topics Concern    Not on file   Social History Narrative    Not on file     Social Determinants of Health     Financial Resource Strain: Not on file   Food Insecurity: Not on file   Transportation Needs: Not on file   Physical Activity: Not on file   Stress: Not on file   Social Connections: Not on file   Intimate Partner Violence: Not on file   Housing Stability: Not on file     Current

## 2024-08-28 LAB
C TRACH RRNA SPEC QL NAA+PROBE: NEGATIVE
N GONORRHOEA RRNA SPEC QL NAA+PROBE: NEGATIVE

## 2024-08-29 ENCOUNTER — TELEPHONE (OUTPATIENT)
Dept: PHARMACY | Age: 34
End: 2024-08-29

## 2024-08-29 NOTE — PROGRESS NOTES
Pharmacy Note  ED Culture Follow-up    Travis Isabel is a 34 y.o. male.     Allergies: Other     Labs:  No results found for: \"BUN\", \"CREATININE\", \"WBC\"  CrCl cannot be calculated (No successful lab value found.).    Current antimicrobials:   Doxycycline    ASSESSMENT:  Micro results:   Genital culture negative for C. Trachomatis and N. Gonorrhoeae     PLAN:  Need for intervention: Yes  Discussed with: Dr. Johnson  Chosen treatment:    Informed patient of negative culture and instructed patient to discontinue doxycycline.    Patient response:   Called and spoke with patient.    Counseled patient on following up with PCP    Called/sent in prescription to: Not applicable    Please call with any questions. Ext. [phone]    Gayle Fernandes RPH, PharmD 4:14 PM 8/29/2024

## 2024-08-29 NOTE — TELEPHONE ENCOUNTER
Pharmacy Note  ED Culture Follow-up    Travis Isabel is a 34 y.o. male.     Allergies: Other     Labs:  No results found for: \"BUN\", \"CREATININE\", \"WBC\"  CrCl cannot be calculated (No successful lab value found.).    Current antimicrobials:   Doxycycline    ASSESSMENT:  Micro results:   Genital culture negative for C. Trachomatis and N. Gonorrhoeae     PLAN:  Need for intervention: Yes  Discussed with: Dr. Johnson  Chosen treatment:    Informed patient of negative culture and instructed patient to discontinue doxycycline.    Patient response:   Called and spoke with patient.   Counseled patient on following up with PCP    Called/sent in prescription to: Not applicable    Please call with any questions. Ext. [phone]    Gayle Fernandes RPH, PharmD 4:14 PM 8/29/2024

## (undated) DEVICE — Z DISCONTINUED (USE MFG CAT MVABO)  TUBING GAS SAMPLING STD 6.5 FT FEMALE CONN SMRT CAPNOLINE

## (undated) DEVICE — FORCEPS BX L240CM JAW DIA2.8MM L CAP W/ NDL MIC MESH TOOTH

## (undated) DEVICE — YANKAUER,FLEXIBLE HANDLE,REGLR CAPACITY: Brand: MEDLINE INDUSTRIES, INC.

## (undated) DEVICE — JELLY LUBRICATING 3 GM BACTERIOSTATIC

## (undated) DEVICE — TUBING, SUCTION, 3/16" X 6', STRAIGHT: Brand: MEDLINE

## (undated) DEVICE — TUBING, SUCTION, 9/32" X 10', STRAIGHT: Brand: MEDLINE

## (undated) DEVICE — LINER SUCT CANSTR 1500CC SEMI RIG W/ POR HYDROPHOBIC SHUT

## (undated) DEVICE — BRUSH CLN DIA5MM NYL SGL END CBL ASST FLEX DSTL TIP POLYPR